# Patient Record
Sex: MALE | URBAN - METROPOLITAN AREA
[De-identification: names, ages, dates, MRNs, and addresses within clinical notes are randomized per-mention and may not be internally consistent; named-entity substitution may affect disease eponyms.]

---

## 2024-09-25 ENCOUNTER — HOSPITAL ENCOUNTER (OUTPATIENT)
Dept: CT IMAGING | Facility: HOSPITAL | Age: 49
Discharge: HOME | End: 2024-09-25
Attending: INTERNAL MEDICINE

## 2024-09-25 DIAGNOSIS — Z13.6 SCREENING FOR CARDIOVASCULAR CONDITION: ICD-10-CM

## 2024-09-25 NOTE — PROGRESS NOTES
Date of Service 9/25/2024    GASPER TOBIN  Date of Birth 9/24/1975    Patient Age: 49 year old    PCP: Fito Stanton DO  [address]  [address]  [facility] [address]    Heart Scan Consult  Preliminary Heart Scan Score: 0    Previous Screening  Heart Scan Completed Previously: Yes  Year of last heart scan: 2022  Score of last heart scan: 0  Peripheral Vascular Scan Completed Previously: No          Risk Factors  Personal Risk Factors  Non-alterable Risk Factors: Age;Gender  Alterable Risk Factors: Abnormal Cholesterol;Stress      Body Mass Index  There is no height or weight on file to calculate BMI.    Blood Pressure  Blood Pressure measurement declined during this encounter.  (Normal =< 120/80,  Elevated = 120-129/ >80,  High Stage1 130-139/80-89 , Stage2 >140/>90)    Lipid Profile  6/26/2024  Total Cholesterol - 193  HDL - 44  LDL - 119  Triglycerides - 183      Cholesterol Goals  Value   Total  =< 200   HDL  = > 45 Men = > 55 Women   LDL   =< 100   Triglycerides  =< 150       Glucose and Hemoglobin A1C  6/26/2024  Glucose - 87  (Normal Fasting Glucose < 100mg/dl )    Nurse Review  Risk factor information and results reviewed with Nurse: Yes    Recommended Follow Up:  Consult your physician regarding:: Final Heart Scan Report;Discuss potential for Incidental Finding;Discuss Potential for Score Variance      Recommendations for Change:  Nutrition Changes: Low Saturated Fat;Low Fat Dairy;Low Salt Eating;Increase Fiber    Cholesterol Modification (goal of therapy depends upon your risk): Decrease LDL (Lousy/Bad) Ideal <100;Decrease Triglycerides (Ugly) Normal <150;Increase HDL (Healthy/Good) Normal >45 Men >55 Women    Exercise: Enhance Current Program    Smoking Cessation: > 1 Year Ago (Quit smoking 2 years ago)         Stress Management: Adopt Stress Management Techniques    Repeat Heart Scan: 5 years if Calcium Score is 0.0;Discuss with your Physician              [facility] Recommended  Resources:  Recommended Resources: Upcoming Classes, Medical Services and Health Library [email or web address]     Other Resources:: Handouts given - Controlling Risk Factors, Cholesterol, and Triglycerides. Stress handout offered as well.      Deana RUSS RN        Please Contact the Nurse Heart Line with any Questions or Concerns [phone].

## 2024-12-07 ENCOUNTER — HOSPITAL ENCOUNTER (OUTPATIENT)
Age: 49
Discharge: HOME | End: 2024-12-07
Payer: COMMERCIAL

## 2024-12-07 ENCOUNTER — APPOINTMENT (OUTPATIENT)
Dept: GENERAL RADIOLOGY | Age: 49
End: 2024-12-07
Attending: NURSE PRACTITIONER
Payer: COMMERCIAL

## 2024-12-07 VITALS
OXYGEN SATURATION: 97 % | SYSTOLIC BLOOD PRESSURE: 112 MMHG | TEMPERATURE: 99 F | DIASTOLIC BLOOD PRESSURE: 72 MMHG | HEART RATE: 67 BPM | RESPIRATION RATE: 18 BRPM

## 2024-12-07 DIAGNOSIS — M54.13 RADICULOPATHY OF CERVICOTHORACIC REGION: Primary | ICD-10-CM

## 2024-12-07 DIAGNOSIS — M25.512 ACUTE PAIN OF LEFT SHOULDER: ICD-10-CM

## 2024-12-07 LAB
#MXD IC: 0.3 X10ˆ3/UL (ref 0.1–1)
BUN BLD-MCNC: 19 MG/DL (ref 7–18)
CHLORIDE BLD-SCNC: 103 MMOL/L (ref 98–112)
CO2 BLD-SCNC: 26 MMOL/L (ref 21–32)
CREAT BLD-MCNC: 1.2 MG/DL
EGFRCR SERPLBLD CKD-EPI 2021: 73 ML/MIN/1.73M2 (ref 60–?)
GLUCOSE BLD-MCNC: 87 MG/DL (ref 70–99)
HCT VFR BLD AUTO: 45.6 %
HCT VFR BLD CALC: 49 %
HGB BLD-MCNC: 15 G/DL
ISTAT IONIZED CALCIUM FOR CHEM 8: 1.21 MMOL/L (ref 1.12–1.32)
LYMPHOCYTES # BLD AUTO: 2 X10ˆ3/UL (ref 1–4)
LYMPHOCYTES NFR BLD AUTO: 35.1 %
MCH RBC QN AUTO: 29.4 PG (ref 26–34)
MCHC RBC AUTO-ENTMCNC: 32.9 G/DL (ref 31–37)
MCV RBC AUTO: 89.4 FL (ref 80–100)
MIXED CELL %: 5.5 %
NEUTROPHILS # BLD AUTO: 3.5 X10ˆ3/UL (ref 1.5–7.7)
NEUTROPHILS NFR BLD AUTO: 59.4 %
PLATELET # BLD AUTO: 186 X10ˆ3/UL (ref 150–450)
POTASSIUM BLD-SCNC: 4.3 MMOL/L (ref 3.6–5.1)
RBC # BLD AUTO: 5.1 X10ˆ6/UL
SODIUM BLD-SCNC: 140 MMOL/L (ref 136–145)
TROPONIN I BLD-MCNC: <0.02 NG/ML
WBC # BLD AUTO: 5.8 X10ˆ3/UL (ref 4–11)

## 2024-12-07 PROCEDURE — 99204 OFFICE O/P NEW MOD 45 MIN: CPT | Performed by: NURSE PRACTITIONER

## 2024-12-07 PROCEDURE — 85025 COMPLETE CBC W/AUTO DIFF WBC: CPT | Performed by: NURSE PRACTITIONER

## 2024-12-07 PROCEDURE — 80047 BASIC METABLC PNL IONIZED CA: CPT | Performed by: NURSE PRACTITIONER

## 2024-12-07 PROCEDURE — 72040 X-RAY EXAM NECK SPINE 2-3 VW: CPT | Performed by: NURSE PRACTITIONER

## 2024-12-07 PROCEDURE — 73030 X-RAY EXAM OF SHOULDER: CPT | Performed by: NURSE PRACTITIONER

## 2024-12-07 PROCEDURE — 93000 ELECTROCARDIOGRAM COMPLETE: CPT | Performed by: NURSE PRACTITIONER

## 2024-12-07 PROCEDURE — 84484 ASSAY OF TROPONIN QUANT: CPT | Performed by: NURSE PRACTITIONER

## 2024-12-07 NOTE — ED INITIAL ASSESSMENT (HPI)
Pt with intermittent left shoulder pain x1 month. The pain is located above left shoulder blade and on the bottom of the left shoulder blade. Pt states he at times feels a spasm or \"fluttering\" feeling in the shoulder. Unknown trauma. Denies chest pain, sob, nausea, diaphoresis. Pt states left shoulder pain is present on the top of his shoulder blade, currently describes as a pulled muscle.

## 2024-12-07 NOTE — DISCHARGE INSTRUCTIONS
As discussed, bloodwork is reassuring.  Troponin which is negative.  This is a cardiac enzyme on your heart that we look at to see there is been damage to heart muscle.  EKG normal.  X-ray of neck and shoulder without any acute findings.    However, I do believe this to be coming likely from your neck.  I have attached information on cervical radiculopathy.  Please follow-up with specialist, physiatrist, who could further evaluate you for neck and shoulder pain.    I recommend heat application, 4 times a day for 15 minutes.  Tylenol, Motrin as needed for pain alleviation.  Topical analgesics such as: IcyHot, Bengay, [place] balm.    If you have any worsening symptoms, chest pain, dizziness, light has come palpitations, shortness of breath, please go to emergency room.

## 2024-12-08 LAB
ATRIAL RATE: 61 BPM
P AXIS: 74 DEGREES
P-R INTERVAL: 164 MS
Q-T INTERVAL: 372 MS
QRS DURATION: 100 MS
QTC CALCULATION (BEZET): 374 MS
R AXIS: 42 DEGREES
T AXIS: 35 DEGREES
VENTRICULAR RATE: 61 BPM

## 2024-12-08 NOTE — ED PROVIDER NOTES
Patient Seen in: [facility]      History     Chief Complaint   Patient presents with    Shoulder Pain     Stated Complaint: righ shoulder and back pain    Subjective: This is a 49-year-old male, no significant past medical history, presents to immediate care clinic for evaluation of left shoulder blade pain x 1 month.  Patient denies any fall, injury, trauma.  However, does report heavy lifting on a daily basis with his job as a .  Patient states sometimes it feels like fluttering\" and \"electricity\".  Pain can occur while working or while at rest.  He denies that pain radiates into his chest.  He denies dizziness, lightheadedness, palpitations, shortness of breath.  Patient states pain \"spasms\".  He feels as if it is sometimes above or below his shoulder blade and radiates across back.  Recently saw a cardiologist in September with nuclear stress test, calcium scoring, no acute findings.  Patient is not taking any medication for his pain, however, he does note that heat seems to help.  He is left-hand dominant.  He denies numbness or tingling in extremity, however, states sometimes when he is driving he notices slight weakness.  Denies any neck pain, injury, known cervical spine issues.  However, he does note neck tightness.  Well-appearing.  Neurologically intact on exam.  AOx4  The history is provided by the patient.             Objective:     Past Medical History:    Anxiety              History reviewed. No pertinent surgical history.             Social History     Socioeconomic History    Marital status: Unknown     Social Drivers of Health     Food Insecurity: Low Risk  (10/20/2023)    Received from [facility]    Food Insecurity     Have there been times that your food ran out, and you didn't have money to get more?: No     Are there times that you worry that this might happen?: No   Transportation Needs: Low Risk  (10/20/2023)    Received from  [facility]    Transportation Needs     Do you have trouble getting transportation to medical appointments?: No   Housing Stability:   Recent Concern: Housing Stability - At Risk (8/22/2023)    Received from [facility]              Review of Systems   Constitutional: Negative.  Negative for activity change, appetite change, chills, fatigue and fever.   Eyes: Negative.    Respiratory: Negative.  Negative for chest tightness and shortness of breath.    Cardiovascular: Negative.  Negative for chest pain, palpitations and leg swelling.   Gastrointestinal: Negative.  Negative for nausea and vomiting.   Musculoskeletal:  Positive for arthralgias and neck pain. Negative for back pain.   Skin: Negative.    Neurological:  Positive for weakness. Negative for dizziness, light-headedness, numbness and headaches.       Positive for stated complaint: righ shoulder and back pain  Other systems are as noted in HPI.  Constitutional and vital signs reviewed.      All other systems reviewed and negative except as noted above.    Physical Exam     ED Triage Vitals [12/07/24 1346]   /72   Pulse 67   Resp 18   Temp 98.5 °F (36.9 °C)   Temp src Oral   SpO2 97 %   O2 Device None (Room air)       Current Vitals:   Vital Signs  BP: 112/72  Pulse: 67  Resp: 18  Temp: 98.5 °F (36.9 °C)  Temp src: Oral    Oxygen Therapy  SpO2: 97 %  O2 Device: None (Room air)        Physical Exam  Constitutional:       General: He is not in acute distress.     Appearance: Normal appearance. He is not ill-appearing or toxic-appearing.   HENT:      Head: Normocephalic.      Nose: Nose normal.      Mouth/Throat:      Mouth: Mucous membranes are moist.   Eyes:      Extraocular Movements: Extraocular movements intact.      Pupils: Pupils are equal, round, and reactive to light.   Neck:     Cardiovascular:      Rate and Rhythm: Normal rate and regular rhythm.      Pulses: Normal pulses.      Heart sounds: Normal heart  sounds.   Pulmonary:      Effort: Pulmonary effort is normal. No respiratory distress.      Breath sounds: Normal breath sounds. No stridor. No wheezing, rhonchi or rales.   Chest:      Chest wall: No tenderness.   Musculoskeletal:         General: No swelling. Normal range of motion.      Left shoulder: Tenderness and bony tenderness present. No swelling, deformity, effusion or crepitus. Normal range of motion. Normal strength. Normal pulse.        Arms:       Cervical back: Normal range of motion. Tenderness present. No rigidity. Spinous process tenderness and muscular tenderness present. No pain with movement. Normal range of motion.      Comments: + tenderness to marked areas, although I am unable to reproduce on exam. No mass or spasm. No evidence of trauma. + discomfort with flexion of bilateral upper extremities and opposite resistance applied by provide.    Skin:     General: Skin is warm.      Capillary Refill: Capillary refill takes less than 2 seconds.   Neurological:      General: No focal deficit present.      Mental Status: He is alert and oriented to person, place, and time.             ED Course     Labs Reviewed   POCT ISTAT CHEM8 CARTRIDGE - Abnormal; Notable for the following components:       Result Value    ISTAT BUN 19 (*)     All other components within normal limits   ISTAT TROPONIN - Normal   POCT CBC     EKG    Rate, intervals and axes as noted on EKG Report.  Rate: 61  Rhythm: Sinus Rhythm  Reading: Normal Sinus rhythm - no ST elevation. No previous ECG for comparison.               Heart Score:    HEART Score      Title      Criteria Score   Age: 45-64 Age Score: 1   History: Slightly Suspicious Hx Score: 0     EKG: Normal EKG Score: 0   HTN: No   Hypercholesterolemia: No   Atherosclerosis/PVD: No     DM: No   BMI>30kg/m2: No   Smoking: No   Family History: No         Other Risk Factor Score: 0               Lab Results   Component Value Date    ITROP <0.02 12/07/2024         HEART Score:  1        Risk of adverse cardiac event is 0.9-1.7%          XR SHOULDER, COMPLETE (MIN 2 VIEWS), LEFT (CPT=73030)    Result Date: 12/7/2024  CONCLUSION:  1. Hypertrophic left acromioclavicular degeneration.  2. No radiographically visible acute osseous injury of the left shoulder.    Dictated by (CST): Prakash Nguyen MD on 12/07/2024 at 2:54 PM     Finalized by (CST): Prakash Nguyen MD on 12/07/2024 at 2:54 PM          XR CERVICAL SPINE (2-3 VIEWS) (CPT=72040)    Result Date: 12/7/2024  CONCLUSION:  1. Minimal degenerative changes of cervical spine with relative preservation of the anterior disc spaces.  2. No radiographically visible acute osseous injury of the cervical spine.    Dictated by (CST): Prakash Nguyen MD on 12/07/2024 at 2:52 PM     Finalized by (CST): Prakash Nguyen MD on 12/07/2024 at 2:53 PM                    MDM      Differentials considered include: Cervical radiculopathy, cervical myelopathy, shoulder arthralgia, bone spur, atypical chest pain, arrhythmia.    Discussed with patient that while it is reassuring that symptoms have been ongoing for 1 month, the concern is the description of \"fluttering\" and \"electricity\" and \"electric current\" to left upper posterior back/shoulder blade.  He is aware that this is cardiac equivalent.  While he did have cardiac testing such as a nuclear stress test in calcium scoring test in September, this was for an unrelated issue/complications from COVID-19.  He was not yet having this shoulder pain with \"flutters\" and \"electricity\" -patient is aware some of his symptoms he is reporting is concerning for potential for atypical chest pain or arrhythmia.  He is agreeable to have cardiac workup performed in addition to getting imaging of his cervical spine and shoulder.    Patient is slightly tender to C6-C7 and left-sided neck muscles.  No mass or spasm appreciated.  Patient reports several times during examination that he was feeling the \"fluttering\" and  \"electricity\" to his shoulder.  No spasms seen or felt an area of patient's symptoms.  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  Often heavy lifting, repetitive movements.  Patient has equal strength of bilateral upper and lower extremities with resistance applied.  When patient's upper extremities are in flexion and provider is applying opposite force, patient does have shoulder pain and neck pain.    He has a negative Bonita sign, low suspicion for any cervical myelopathy that may be due to disc bulging, disc herniation.  At this time, I do not believe that a higher level of imaging is warranted.    Did discuss case with supervising physician, Dr. Mahan, agrees with cardiac workup in radiologic imaging.    Patient without leukocytosis.  No anemia.  Chemistry within normal limits, slightly elevated BUN.  Troponin negative.      EKG normal sinus rhythm.  No ST elevation.  No previous EKG for comparison.  No arrhythmia as cause of patient's symptoms    X-ray of cervical spine and shoulder without any acute bony abnormality, bone spur.  There is minimal degeneration of cervical spine but relative preservation of anterior disc spaces.  The normal curvature of cervical spine is maintained.    Patient declining any medication such as naproxen, Flexeril, Medrol Dosepak.  Patient states he gets \"peptides\" from [place] which he injects every month and helps with his arthralgia and myalgia.    Patient is agreeable to see physiatry for further evaluation and management.    Encourage patient to continue to apply heat, topical analgesics, lidocaine patches.    He is aware of signs symptoms that warrant immediate ER evaluation.  He he verbalizes understanding agrees with plan of care.          Medical Decision Making  Amount and/or Complexity of Data Reviewed  External Data Reviewed: labs and notes.  Labs: ordered.  Radiology: ordered and independent interpretation performed. Decision-making details documented  in ED Course.  ECG/medicine tests: ordered and independent interpretation performed. Decision-making details documented in ED Course.        Disposition and Plan     Clinical Impression:  1. Radiculopathy of cervicothoracic region    2. Acute pain of left shoulder         Disposition:  Discharge  12/7/2024  3:19 pm    Follow-up:  Baron Brower MD  [address]  [facility] [id number] [phone]    Schedule an appointment as soon as possible for a visit   This is a physiatrist I would like you to follow up with    Tae Saldivar MD  [address] [facility] [place] [address]  [facility] [id number] [phone]      This is a PCP you can establish care with    Alex Sanchez APRN  [address]  [facility] [id number] [phone]      This is a PCP you can establish care with          Medications Prescribed:  Discharge Medication List as of 12/7/2024  3:19 PM              Supplementary Documentation:

## 2025-07-01 ENCOUNTER — OFFICE VISIT (OUTPATIENT)
Dept: INTERNAL MEDICINE CLINIC | Facility: CLINIC | Age: 50
End: 2025-07-01

## 2025-07-01 VITALS
SYSTOLIC BLOOD PRESSURE: 118 MMHG | HEIGHT: 70 IN | HEART RATE: 75 BPM | DIASTOLIC BLOOD PRESSURE: 70 MMHG | WEIGHT: 192.38 LBS | BODY MASS INDEX: 27.54 KG/M2

## 2025-07-01 DIAGNOSIS — K63.5 POLYP OF COLON, UNSPECIFIED PART OF COLON, UNSPECIFIED TYPE: ICD-10-CM

## 2025-07-01 DIAGNOSIS — Z00.00 ENCOUNTER FOR MEDICAL EXAMINATION TO ESTABLISH CARE: Primary | ICD-10-CM

## 2025-07-01 DIAGNOSIS — R42 VERTIGO: ICD-10-CM

## 2025-07-01 DIAGNOSIS — F41.1 GENERALIZED ANXIETY DISORDER: ICD-10-CM

## 2025-07-01 NOTE — PROGRESS NOTES
Subjective:     Patient ID: Natan Moore is a 49 year old male.    Here to establish with clinic.  His past history has been significant only for history of anxiety disorder for patient.  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          History/Other:   Review of Systems   Constitutional: Negative.    Respiratory: Negative.     Cardiovascular: Negative.  Negative for chest pain, palpitations and leg swelling.   Gastrointestinal: Negative.  Negative for anal bleeding and blood in stool.   Genitourinary: Negative.    Musculoskeletal:  Positive for arthralgias.   Neurological:  Negative for syncope.   Hematological: Negative.      Current Medications[1]  Allergies:Allergies[2]    Past Medical History[3]   Past Surgical History[4]   Family History[5]   Social History: Short Social Hx on File[6]     Objective:   Physical Exam  Constitutional:       General: He is not in acute distress.     Appearance: He is well-developed. He is not ill-appearing, toxic-appearing or diaphoretic.   HENT:      Head: Normocephalic and atraumatic.      Right Ear: Tympanic membrane, ear canal and external ear normal.      Left Ear: Tympanic membrane, ear canal and external ear normal.      Nose: Nose normal.      Mouth/Throat:      Pharynx: No oropharyngeal exudate.   Eyes:      General: No scleral icterus.        Right eye: No discharge.         Left eye: No discharge.      Conjunctiva/sclera: Conjunctivae normal.      Pupils: Pupils are equal, round, and reactive to light.   Neck:      Thyroid: No thyromegaly.      Vascular: No carotid bruit or JVD.   Cardiovascular:      Rate and Rhythm: Normal rate and regular rhythm.      Pulses: Normal pulses.      Heart sounds: Normal heart sounds. No murmur heard.     No friction rub. No gallop.   Pulmonary:      Effort: Pulmonary effort is normal. No respiratory  distress.      Breath sounds: Normal breath sounds. No wheezing or rales.   Abdominal:      General: Abdomen is flat. Bowel sounds are normal. There is no distension.      Palpations: Abdomen is soft. There is no mass.      Tenderness: There is no abdominal tenderness. There is no guarding or rebound.   Musculoskeletal:         General: Normal range of motion.      Cervical back: Normal range of motion and neck supple. No rigidity or tenderness.      Right lower leg: No edema.      Left lower leg: No edema.   Lymphadenopathy:      Cervical: No cervical adenopathy.   Skin:     General: Skin is warm and dry.      Coloration: Skin is not jaundiced or pale.      Findings: No rash.   Neurological:      Mental Status: He is alert and oriented to person, place, and time.   Psychiatric:         Mood and Affect: Mood normal.         Assessment & Plan:   1. Encounter for medical examination to establish care  Patient's seen and examined, history taken, see below.    2. Generalized anxiety disorder  Patient taking Lexapro 5 mg daily.  He said that since joining a men's group discharge could significantly help his anxiety.    3. Polyp of colon, unspecified part of colon, unspecified type  Patient had his colonoscopy done 2024 and showed a benign polyp.  He was recommended by his GI to repeat colonoscopy in 5 years.    4. Vertigo  Patient states she had an episode of vertigo few months ago, and has had an extensive neurologic workup.  His vertigo lasted only for 3 days has not recurred since then.    No orders of the defined types were placed in this encounter.      Meds This Visit:  Requested Prescriptions      No prescriptions requested or ordered in this encounter       Imaging & Referrals:  None            [1]   Current Outpatient Medications   Medication Sig Dispense Refill    Escitalopram Oxalate (LEXAPRO OR) Take 5 mg by mouth daily.     [2] No Known Allergies  [3]   Past Medical History:   Anxiety   [4]   Past Surgical  History:  Procedure Laterality Date    Cholecystectomy     [5] History reviewed. No pertinent family history.  [6]   Social History  Socioeconomic History    Marital status:    Tobacco Use    Smoking status: Former     Types: Cigarettes     Start date: 2022     Quit date: 1995     Years since quittin.5    Smokeless tobacco: Never   Vaping Use    Vaping status: Never Used   Substance and Sexual Activity    Alcohol use: Yes     Comment: occosionally    Drug use: Never     Social Drivers of Health     Food Insecurity: Low Risk  (10/20/2023)    Received from [facility]    Food Insecurity     Have there been times that your food ran out, and you didn't have money to get more?: No     Are there times that you worry that this might happen?: No   Transportation Needs: Low Risk  (10/20/2023)    Received from [facility]    Transportation Needs     Do you have trouble getting transportation to medical appointments?: No   Housing Stability:   Recent Concern: Housing Stability - At Risk (2023)    Received from [facility]

## 2025-08-12 ENCOUNTER — NURSE TRIAGE (OUTPATIENT)
Dept: INTERNAL MEDICINE CLINIC | Facility: CLINIC | Age: 50
End: 2025-08-12

## 2025-08-12 ENCOUNTER — OFFICE VISIT (OUTPATIENT)
Dept: INTERNAL MEDICINE CLINIC | Facility: CLINIC | Age: 50
End: 2025-08-12

## 2025-08-12 VITALS
BODY MASS INDEX: 26.63 KG/M2 | DIASTOLIC BLOOD PRESSURE: 70 MMHG | OXYGEN SATURATION: 100 % | WEIGHT: 186 LBS | HEIGHT: 70 IN | HEART RATE: 73 BPM | TEMPERATURE: 99 F | SYSTOLIC BLOOD PRESSURE: 110 MMHG

## 2025-08-12 DIAGNOSIS — R42 VERTIGO: Primary | ICD-10-CM

## 2025-08-12 DIAGNOSIS — E80.4 GILBERT SYNDROME: ICD-10-CM

## 2025-08-12 DIAGNOSIS — Z83.49 FAMILY HISTORY OF THYROID DISEASE: ICD-10-CM

## 2025-08-12 DIAGNOSIS — Z91.09 ENVIRONMENTAL ALLERGIES: ICD-10-CM

## 2025-08-12 PROCEDURE — 99214 OFFICE O/P EST MOD 30 MIN: CPT | Performed by: NURSE PRACTITIONER

## 2025-08-12 RX ORDER — AZELASTINE 1 MG/ML
2 SPRAY, METERED NASAL 2 TIMES DAILY
Qty: 30 ML | Refills: 0 | Status: SHIPPED | OUTPATIENT
Start: 2025-08-12

## 2025-08-12 RX ORDER — ESCITALOPRAM OXALATE 10 MG/1
10 TABLET ORAL EVERY MORNING
COMMUNITY
Start: 2025-07-09

## 2025-08-12 RX ORDER — LEVOCETIRIZINE DIHYDROCHLORIDE 5 MG/1
5 TABLET, FILM COATED ORAL EVERY EVENING
Qty: 30 TABLET | Refills: 1 | Status: SHIPPED | OUTPATIENT
Start: 2025-08-12

## 2025-08-13 ENCOUNTER — TELEPHONE (OUTPATIENT)
Dept: INTERNAL MEDICINE CLINIC | Facility: CLINIC | Age: 50
End: 2025-08-13

## 2025-08-17 LAB
HEMATOCRIT: 50.2 % (ref 38.5–50)
HEMOGLOBIN: 16.9 G/DL (ref 13.2–17.1)
MCH: 31.4 PG (ref 27–33)
MCHC: 33.7 G/DL (ref 32–36)
MCV: 93.1 FL (ref 80–100)
MPV: 11.4 FL (ref 7.5–12.5)
PLATELET COUNT: 204 THOUSAND/UL (ref 140–400)
RDW: 12.5 % (ref 11–15)
RED BLOOD CELL COUNT: 5.39 MILLION/UL (ref 4.2–5.8)
T3 REVERSE, /MS/MS: 16 NG/DL (ref 8–25)
T3, FREE: 2.9 PG/ML (ref 2.3–4.2)
THYROID PEROXIDASE$ANTIBODIES: 1 IU/ML
TSH W/REFLEX TO FT4: 1.54 MIU/L (ref 0.4–4.5)
WHITE BLOOD CELL COUNT: 4.8 THOUSAND/UL (ref 3.8–10.8)